# Patient Record
Sex: MALE | Race: OTHER | HISPANIC OR LATINO | URBAN - METROPOLITAN AREA
[De-identification: names, ages, dates, MRNs, and addresses within clinical notes are randomized per-mention and may not be internally consistent; named-entity substitution may affect disease eponyms.]

---

## 2020-09-03 ENCOUNTER — OFFICE VISIT (OUTPATIENT)
Dept: URBAN - METROPOLITAN AREA CLINIC 82 | Facility: CLINIC | Age: 70
End: 2020-09-03
Payer: MEDICARE

## 2020-09-03 DIAGNOSIS — Z86.010 PERSONAL HISTORY OF COLON POLYPS: ICD-10-CM

## 2020-09-03 DIAGNOSIS — K58.9 IRRITABLE BOWEL SYNDROME: ICD-10-CM

## 2020-09-03 DIAGNOSIS — R19.7 DIARRHEA: ICD-10-CM

## 2020-09-03 DIAGNOSIS — K76.0 FATTY LIVER: ICD-10-CM

## 2020-09-03 PROCEDURE — G8417 CALC BMI ABV UP PARAM F/U: HCPCS | Performed by: INTERNAL MEDICINE

## 2020-09-03 PROCEDURE — 1036F TOBACCO NON-USER: CPT | Performed by: INTERNAL MEDICINE

## 2020-09-03 PROCEDURE — G8427 DOCREV CUR MEDS BY ELIG CLIN: HCPCS | Performed by: INTERNAL MEDICINE

## 2020-09-03 PROCEDURE — 99214 OFFICE O/P EST MOD 30 MIN: CPT | Performed by: INTERNAL MEDICINE

## 2020-09-03 NOTE — HPI-TODAY'S VISIT:
09/03/2020 Patient presents for 6-month follow up. He denies any diarrhea or bloating. He had labs drawn about 4 weeks ago with Dr. Tabor. He is not on any medication at this time. Denies any melena or hematochezia.

## 2020-09-03 NOTE — HPI-OTHER HISTORIES
The patient is a 70 year old Other Race,  or  male, who presents on referral from Kim Tabor MD, for a colonoscopy. A copy of this document will be sent to the referring provider. The patient has never had a colonoscopy before. The patient does not have any risk factors for colon cancer, but is over the recommended age for screening.  The patient reports that there has been recent rectal bleeding. He reports melena. The melena been present for months It occurs rarely The patient also reports diarrhea. The diarrhea has been present months, and the patient states he is having approximately 2 stools per day.     09/13/2018 Patient admits to diarrhea when eating certain vegetables. Admits to occasional bloating. Admits to one episode of melena after drinking peptobismol. Patient admits to personal history of fatty liver. No weight loss.  11/2/18 Colonoscopy on 10/18/2018 found hemorrhoids and diverticulosis. Path normal. Patient stopped taking pill, because he thought it caused sugar to go up. Admits to loose bowels, states the symptoms have improved. Unable to tolerate Bentyl. Complaining of bloating.  12/07/2018 Liver bx on 11/23/2018 found mild steatosis w/ rare balloon cells and mild pericellular fibrosis (stage 1a of 4). No cirrhoisis was found. Blood work on 11/21/2018 found normal PT w/ INR, low Hgb at 13.2, and low platelets at 135.  Diarrhea improved on daily questran. He is also taking dicyclomine.  3/6/2019 Patient states he has been eating something small at night, to maintain his glucose levels. Symptoms improved. He denies any abdominal pain, bloaitng. He was taking Questran powder for diarrhea. Denies any leg swelling, no jaundice. He is taking the Aliyah twice a day. He states he stopped exercising for the past 2 weeks, since he was selling a house, but he has been active, going to the gym 3 times a week. He has lost about 15 lbs. Denies any melena or hematochezia.  9/17/2019 Labs drawn on 3/6/2019 showed low hemoglobin of 12.9, liver enzymes were normal. Repeat labs drawn with PCP Dr. Kim Tabor show high glucose and normal liver enzymes.  Patient denies any new symptoms since his last visit. He denies any jaundice, ascites or pedal edema. He states he finished taking Aliyah. He states he had repeat labs done last week, by Dr. Tabor. He states he went on vacation to [place] for 2 weeks and states he gained some weight. He denies drinking alcohol. He states he has been eating better and he exercises regularly. Denies any abdominal pain, bloating, constipation or diarrhea. Denies any melena or hematochezia.  03/10/2020 Patient states everything has been fine. He has occasional diarrhea, depending on what he eats. He states diarrhea occurs when he eats corn or greens. He states Questran helped with diarrhea, but he is not taking it at this time. Previous labs drawn in September 2019 with Dr. Tabor, showed normalized liver enzymes. He does complain of bloating and excess of gas. Denies any jaundice, abdominal pain, no ascites or pedal edema.

## 2020-09-05 LAB
A/G RATIO: 2.2
ALBUMIN: 4.7
ALKALINE PHOSPHATASE: 41
ALT (SGPT): 15
AST (SGOT): 16
BILIRUBIN, TOTAL: 0.3
BUN/CREATININE RATIO: 26
BUN: 26
CALCIUM: 9.7
CARBON DIOXIDE, TOTAL: 20
CHLORIDE: 103
CREATININE: 1.01
EGFR IF AFRICN AM: 87
EGFR IF NONAFRICN AM: 76
GLOBULIN, TOTAL: 2.1
GLUCOSE: 146
HEMATOCRIT: 40.3
HEMOGLOBIN: 12.5
MCH: 28.7
MCHC: 31
MCV: 92
NRBC: (no result)
PLATELETS: 146
POTASSIUM: 4.2
PROTEIN, TOTAL: 6.8
RBC: 4.36
RDW: 14.2
SODIUM: 139
WBC: 5.4

## 2020-09-10 ENCOUNTER — WEB ENCOUNTER (OUTPATIENT)
Dept: URBAN - METROPOLITAN AREA CLINIC 82 | Facility: CLINIC | Age: 70
End: 2020-09-10

## 2020-10-27 ENCOUNTER — OFFICE VISIT (OUTPATIENT)
Dept: URBAN - METROPOLITAN AREA CLINIC 82 | Facility: CLINIC | Age: 70
End: 2020-10-27

## 2020-11-05 ENCOUNTER — OFFICE VISIT (OUTPATIENT)
Dept: URBAN - METROPOLITAN AREA CLINIC 115 | Facility: CLINIC | Age: 70
End: 2020-11-05
Payer: MEDICARE

## 2020-11-05 ENCOUNTER — WEB ENCOUNTER (OUTPATIENT)
Dept: URBAN - METROPOLITAN AREA CLINIC 115 | Facility: CLINIC | Age: 70
End: 2020-11-05

## 2020-11-05 DIAGNOSIS — D50.9 IRON DEFICIENCY ANEMIA: ICD-10-CM

## 2020-11-05 DIAGNOSIS — K58.9 IRRITABLE BOWEL SYNDROME: ICD-10-CM

## 2020-11-05 DIAGNOSIS — R19.7 DIARRHEA: ICD-10-CM

## 2020-11-05 DIAGNOSIS — Z86.010 PERSONAL HISTORY OF COLON POLYPS: ICD-10-CM

## 2020-11-05 DIAGNOSIS — K76.0 FATTY LIVER: ICD-10-CM

## 2020-11-05 PROCEDURE — G8417 CALC BMI ABV UP PARAM F/U: HCPCS | Performed by: INTERNAL MEDICINE

## 2020-11-05 PROCEDURE — G8427 DOCREV CUR MEDS BY ELIG CLIN: HCPCS | Performed by: INTERNAL MEDICINE

## 2020-11-05 PROCEDURE — 99214 OFFICE O/P EST MOD 30 MIN: CPT | Performed by: INTERNAL MEDICINE

## 2020-11-05 PROCEDURE — 1036F TOBACCO NON-USER: CPT | Performed by: INTERNAL MEDICINE

## 2020-11-05 NOTE — HPI-TODAY'S VISIT:
09/03/2020 Patient presents for 6-month follow up. He denies any diarrhea or bloating. He had labs drawn about 4 weeks ago with Dr. Tabor. He is not on any medication at this time. Denies any melena or hematochezia.  11/05/2020 Patient presents for a follow up office visit. Labs on 09/03/2020 showed normal liver enzymes, hemoglobin is 12.5. Patient states he is gaining weight. He complains of retaining liquid and has noticed pedal edema. He states Dr. Tabor has started him on Furosemide. He did start a new blood pressure medication. He had repeat bloodwork recently, which showed hemoglobin 12.2, normal liver enzymes.

## 2020-11-06 LAB
IRON BIND.CAP.(TIBC): 394
IRON SATURATION: 20
IRON: 80
UIBC: 314

## 2020-11-13 ENCOUNTER — CLAIMS CREATED FROM THE CLAIM WINDOW (OUTPATIENT)
Dept: URBAN - METROPOLITAN AREA CLINIC 4 | Facility: CLINIC | Age: 70
End: 2020-11-13
Payer: MEDICARE

## 2020-11-13 ENCOUNTER — OFFICE VISIT (OUTPATIENT)
Dept: URBAN - METROPOLITAN AREA SURGERY CENTER 13 | Facility: SURGERY CENTER | Age: 70
End: 2020-11-13
Payer: MEDICARE

## 2020-11-13 DIAGNOSIS — K31.89 OTHER DISEASES OF STOMACH AND DUODENUM: ICD-10-CM

## 2020-11-13 DIAGNOSIS — K29.60 OTHER GASTRITIS WITHOUT BLEEDING: ICD-10-CM

## 2020-11-13 DIAGNOSIS — K21.9 ACID REFLUX: ICD-10-CM

## 2020-11-13 DIAGNOSIS — K21.00 GASTRO-ESOPHAGEAL REFLUX DISEASE WITH ESOPHAGITIS, WITHOUT BLEEDING: ICD-10-CM

## 2020-11-13 DIAGNOSIS — D50.9 ANEMIA, IRON DEFICIENCY: ICD-10-CM

## 2020-11-13 DIAGNOSIS — K31.89 ACQUIRED DEFORMITY OF DUODENUM: ICD-10-CM

## 2020-11-13 DIAGNOSIS — R10.13 ABDOMINAL DISCOMFORT, EPIGASTRIC: ICD-10-CM

## 2020-11-13 PROCEDURE — 88305 TISSUE EXAM BY PATHOLOGIST: CPT | Performed by: PATHOLOGY

## 2020-11-13 PROCEDURE — 43239 EGD BIOPSY SINGLE/MULTIPLE: CPT | Performed by: INTERNAL MEDICINE

## 2020-11-13 PROCEDURE — G8907 PT DOC NO EVENTS ON DISCHARG: HCPCS | Performed by: INTERNAL MEDICINE

## 2020-11-13 PROCEDURE — 88312 SPECIAL STAINS GROUP 1: CPT | Performed by: PATHOLOGY

## 2020-11-13 PROCEDURE — 88342 IMHCHEM/IMCYTCHM 1ST ANTB: CPT | Performed by: PATHOLOGY

## 2020-11-20 ENCOUNTER — TELEPHONE ENCOUNTER (OUTPATIENT)
Dept: URBAN - METROPOLITAN AREA CLINIC 115 | Facility: CLINIC | Age: 70
End: 2020-11-20

## 2020-12-17 ENCOUNTER — OFFICE VISIT (OUTPATIENT)
Dept: URBAN - METROPOLITAN AREA CLINIC 115 | Facility: CLINIC | Age: 70
End: 2020-12-17
Payer: MEDICARE

## 2020-12-17 DIAGNOSIS — K76.0 FATTY LIVER: ICD-10-CM

## 2020-12-17 DIAGNOSIS — R19.7 DIARRHEA: ICD-10-CM

## 2020-12-17 DIAGNOSIS — Z86.010 PERSONAL HISTORY OF COLON POLYPS: ICD-10-CM

## 2020-12-17 DIAGNOSIS — K58.9 IRRITABLE BOWEL SYNDROME: ICD-10-CM

## 2020-12-17 DIAGNOSIS — D50.9 IRON DEFICIENCY ANEMIA: ICD-10-CM

## 2020-12-17 PROCEDURE — G9903 PT SCRN TBCO ID AS NON USER: HCPCS | Performed by: INTERNAL MEDICINE

## 2020-12-17 PROCEDURE — G8417 CALC BMI ABV UP PARAM F/U: HCPCS | Performed by: INTERNAL MEDICINE

## 2020-12-17 PROCEDURE — G8427 DOCREV CUR MEDS BY ELIG CLIN: HCPCS | Performed by: INTERNAL MEDICINE

## 2020-12-17 PROCEDURE — 99214 OFFICE O/P EST MOD 30 MIN: CPT | Performed by: INTERNAL MEDICINE

## 2020-12-17 RX ORDER — HYDROCORTISONE 2.5% 25 MG/G
1 APPLICATION CREAM TOPICAL TWICE A DAY
Qty: 60 GRAMS | Refills: 1 | OUTPATIENT
Start: 2020-12-17 | End: 2021-01-14

## 2020-12-17 NOTE — HPI-OTHER HISTORIES
11/2020  The patient is a 70 year old Other Race,  or  male, who presents on referral from Kim Tabor MD, for a colonoscopy. A copy of this document will be sent to the referring provider. The patient has never had a colonoscopy before. The patient does not have any risk factors for colon cancer, but is over the recommended age for screening.  The patient reports that there has been recent rectal bleeding. He reports melena. The melena been present for months It occurs rarely The patient also reports diarrhea. The diarrhea has been present months, and the patient states he is having approximately 2 stools per day.     09/13/2018 Patient admits to diarrhea when eating certain vegetables. Admits to occasional bloating. Admits to one episode of melena after drinking peptobismol. Patient admits to personal history of fatty liver. No weight loss.  11/2/18 Colonoscopy on 10/18/2018 found hemorrhoids and diverticulosis. Path normal. Patient stopped taking pill, because he thought it caused sugar to go up. Admits to loose bowels, states the symptoms have improved. Unable to tolerate Bentyl. Complaining of bloating.  12/07/2018 Liver bx on 11/23/2018 found mild steatosis w/ rare balloon cells and mild pericellular fibrosis (stage 1a of 4). No cirrhoisis was found. Blood work on 11/21/2018 found normal PT w/ INR, low Hgb at 13.2, and low platelets at 135.  Diarrhea improved on daily questran. He is also taking dicyclomine.  3/6/2019 Patient states he has been eating something small at night, to maintain his glucose levels. Symptoms improved. He denies any abdominal pain, bloaitng. He was taking Questran powder for diarrhea. Denies any leg swelling, no jaundice. He is taking the Aliyah twice a day. He states he stopped exercising for the past 2 weeks, since he was selling a house, but he has been active, going to the gym 3 times a week. He has lost about 15 lbs. Denies any melena or hematochezia.  9/17/2019 Labs drawn on 3/6/2019 showed low hemoglobin of 12.9, liver enzymes were normal. Repeat labs drawn with PCP Dr. Kim Tabor show high glucose and normal liver enzymes.  Patient denies any new symptoms since his last visit. He denies any jaundice, ascites or pedal edema. He states he finished taking Aliyah. He states he had repeat labs done last week, by Dr. Tabor. He states he went on vacation to [place] for 2 weeks and states he gained some weight. He denies drinking alcohol. He states he has been eating better and he exercises regularly. Denies any abdominal pain, bloating, constipation or diarrhea. Denies any melena or hematochezia.  03/10/2020 Patient states everything has been fine. He has occasional diarrhea, depending on what he eats. He states diarrhea occurs when he eats corn or greens. He states Questran helped with diarrhea, but he is not taking it at this time. Previous labs drawn in September 2019 with Dr. Tabor, showed normalized liver enzymes. He does complain of bloating and excess of gas. Denies any jaundice, abdominal pain, no ascites or pedal edema.

## 2020-12-17 NOTE — HPI-TODAY'S VISIT:
09/03/2020 Patient presents for 6-month follow up. He denies any diarrhea or bloating. He had labs drawn about 4 weeks ago with Dr. Tabor. He is not on any medication at this time. Denies any melena or hematochezia.  11/05/2020 Patient presents for a follow up office visit. Labs on 09/03/2020 showed normal liver enzymes, hemoglobin is 12.5. Patient states he is gaining weight. He complains of retaining liquid and has noticed pedal edema. He states Dr. Tabor has started him on Furosemide. He did start a new blood pressure medication. He had repeat bloodwork recently, which showed hemoglobin 12.2, normal liver enzymes.  12/17/2020 Patient presents for a follow up office visit. Patient's iron saturation is 20%. EGD on 11/13/2020 showed gastritis and esophagitis. Biopsy showed gastritis, no H pylori.  He states he saw the ENT and found to have acid reflux in the throat. He was also advised to check for food allergies. Biopsy during egd did not reveal any eosinophilic esophagitis. He was prescribed pantoprazole by the ENT and continues to take it daily. He states he has been trying to maintain his blood sugar levels.  He does want to consider hemorrhoidectomy for his hemorrhoids in the future.

## 2021-06-15 ENCOUNTER — TELEPHONE ENCOUNTER (OUTPATIENT)
Dept: URBAN - METROPOLITAN AREA CLINIC 115 | Facility: CLINIC | Age: 71
End: 2021-06-15

## 2021-06-17 ENCOUNTER — LAB OUTSIDE AN ENCOUNTER (OUTPATIENT)
Dept: URBAN - METROPOLITAN AREA CLINIC 115 | Facility: CLINIC | Age: 71
End: 2021-06-17

## 2021-06-17 ENCOUNTER — OFFICE VISIT (OUTPATIENT)
Dept: URBAN - METROPOLITAN AREA CLINIC 115 | Facility: CLINIC | Age: 71
End: 2021-06-17
Payer: MEDICARE

## 2021-06-17 ENCOUNTER — WEB ENCOUNTER (OUTPATIENT)
Dept: URBAN - METROPOLITAN AREA CLINIC 115 | Facility: CLINIC | Age: 71
End: 2021-06-17

## 2021-06-17 ENCOUNTER — OFFICE VISIT (OUTPATIENT)
Dept: URBAN - METROPOLITAN AREA CLINIC 115 | Facility: CLINIC | Age: 71
End: 2021-06-17

## 2021-06-17 DIAGNOSIS — D50.9 IRON DEFICIENCY ANEMIA: ICD-10-CM

## 2021-06-17 DIAGNOSIS — Z86.010 PERSONAL HISTORY OF COLON POLYPS: ICD-10-CM

## 2021-06-17 DIAGNOSIS — K58.9 IRRITABLE BOWEL SYNDROME: ICD-10-CM

## 2021-06-17 DIAGNOSIS — R19.7 DIARRHEA: ICD-10-CM

## 2021-06-17 DIAGNOSIS — K76.0 FATTY LIVER: ICD-10-CM

## 2021-06-17 PROCEDURE — 99214 OFFICE O/P EST MOD 30 MIN: CPT | Performed by: INTERNAL MEDICINE

## 2021-06-17 PROCEDURE — G9903 PT SCRN TBCO ID AS NON USER: HCPCS | Performed by: INTERNAL MEDICINE

## 2021-06-17 PROCEDURE — G8427 DOCREV CUR MEDS BY ELIG CLIN: HCPCS | Performed by: INTERNAL MEDICINE

## 2021-06-17 PROCEDURE — G8417 CALC BMI ABV UP PARAM F/U: HCPCS | Performed by: INTERNAL MEDICINE

## 2021-06-17 RX ORDER — DICYCLOMINE HYDROCHLORIDE 10 MG/1
1 TABLET CAPSULE ORAL THREE TIMES A DAY
Qty: 270 TABLET | Refills: 1 | OUTPATIENT
Start: 2021-06-17 | End: 2021-12-13

## 2021-06-17 NOTE — HPI-TODAY'S VISIT:
09/03/2020 Patient presents for 6-month follow up. He denies any diarrhea or bloating. He had labs drawn about 4 weeks ago with Dr. Tabor. He is not on any medication at this time. Denies any melena or hematochezia.  11/05/2020 Patient presents for a follow up office visit. Labs on 09/03/2020 showed normal liver enzymes, hemoglobin is 12.5. Patient states he is gaining weight. He complains of retaining liquid and has noticed pedal edema. He states Dr. Tabor has started him on Furosemide. He did start a new blood pressure medication. He had repeat bloodwork recently, which showed hemoglobin 12.2, normal liver enzymes.  12/17/2020 Patient presents for a follow up office visit. Patient's iron saturation is 20%. EGD on 11/13/2020 showed gastritis and esophagitis. Biopsy showed gastritis, no H pylori. He states he saw the ENT and found to have acid reflux in the throat. He was also advised to check for food allergies. Biopsy during egd did not reveal any eosinophilic esophagitis. He was prescribed pantoprazole by the ENT and continues to take it daily. He states he has been trying to maintain his blood sugar levels. He does want to consider hemorrhoidectomy for his hemorrhoids in the future.   06/17/2021 Patient presents for a follow up office visit. EGD on 11/13/2020 showed esophagitis and gastritis. Biopsy showed gastritis, no H pylori. Patient says he is feeling well. He is not currently taking any stomach medications. His diarrhea and abdominal pain have resolved. He still experiences occasional bloating. Denies any acid reflux or constipation.

## 2021-07-07 ENCOUNTER — LAB OUTSIDE AN ENCOUNTER (OUTPATIENT)
Dept: URBAN - METROPOLITAN AREA CLINIC 115 | Facility: CLINIC | Age: 71
End: 2021-07-07

## 2021-07-08 NOTE — PHYSICAL EXAM LYMPHATIC:
Neck , no lymphadenopathy The patient's IBS symptoms have been under good control. Continue high fiber diet and fiber supplements as needed. Call if any exacerbations.

## 2021-12-16 ENCOUNTER — OFFICE VISIT (OUTPATIENT)
Dept: URBAN - METROPOLITAN AREA CLINIC 115 | Facility: CLINIC | Age: 71
End: 2021-12-16

## 2023-02-02 ENCOUNTER — OFFICE VISIT (OUTPATIENT)
Dept: URBAN - METROPOLITAN AREA CLINIC 115 | Facility: CLINIC | Age: 73
End: 2023-02-02
Payer: MEDICARE

## 2023-02-02 ENCOUNTER — DASHBOARD ENCOUNTERS (OUTPATIENT)
Age: 73
End: 2023-02-02

## 2023-02-02 ENCOUNTER — LAB OUTSIDE AN ENCOUNTER (OUTPATIENT)
Dept: URBAN - METROPOLITAN AREA CLINIC 115 | Facility: CLINIC | Age: 73
End: 2023-02-02

## 2023-02-02 ENCOUNTER — WEB ENCOUNTER (OUTPATIENT)
Dept: URBAN - METROPOLITAN AREA CLINIC 115 | Facility: CLINIC | Age: 73
End: 2023-02-02

## 2023-02-02 VITALS — HEIGHT: 68 IN | WEIGHT: 203.4 LBS | TEMPERATURE: 98.6 F | HEART RATE: 81 BPM | BODY MASS INDEX: 30.83 KG/M2

## 2023-02-02 DIAGNOSIS — K76.0 FATTY LIVER: ICD-10-CM

## 2023-02-02 DIAGNOSIS — D50.9 IRON DEFICIENCY ANEMIA: ICD-10-CM

## 2023-02-02 DIAGNOSIS — R19.7 DIARRHEA: ICD-10-CM

## 2023-02-02 DIAGNOSIS — Z86.010 PERSONAL HISTORY OF COLON POLYPS: ICD-10-CM

## 2023-02-02 DIAGNOSIS — R14.0 BLOATING: ICD-10-CM

## 2023-02-02 DIAGNOSIS — K58.9 IRRITABLE BOWEL SYNDROME: ICD-10-CM

## 2023-02-02 DIAGNOSIS — Z12.11 SCREENING FOR COLON CANCER: ICD-10-CM

## 2023-02-02 PROBLEM — 87522002 IRON DEFICIENCY ANEMIA: Status: ACTIVE | Noted: 2020-11-05

## 2023-02-02 PROBLEM — 10743008 IRRITABLE BOWEL SYNDROME: Status: ACTIVE | Noted: 2020-09-03

## 2023-02-02 PROBLEM — 428283002 HISTORY OF POLYP OF COLON: Status: ACTIVE | Noted: 2020-09-03

## 2023-02-02 PROCEDURE — G9903 PT SCRN TBCO ID AS NON USER: HCPCS | Performed by: INTERNAL MEDICINE

## 2023-02-02 PROCEDURE — G8417 CALC BMI ABV UP PARAM F/U: HCPCS | Performed by: INTERNAL MEDICINE

## 2023-02-02 PROCEDURE — G8427 DOCREV CUR MEDS BY ELIG CLIN: HCPCS | Performed by: INTERNAL MEDICINE

## 2023-02-02 PROCEDURE — 99214 OFFICE O/P EST MOD 30 MIN: CPT | Performed by: INTERNAL MEDICINE

## 2023-02-02 RX ORDER — ROSUVASTATIN CALCIUM 10 MG/1
1 TABLET TABLET, FILM COATED ORAL ONCE A DAY
Status: ACTIVE | COMMUNITY

## 2023-02-02 RX ORDER — POLYETHYLENE GLYCOL-3350 AND ELECTROLYTES WITH FLAVOR PACK 240; 5.84; 2.98; 6.72; 22.72 G/278.26G; G/278.26G; G/278.26G; G/278.26G; G/278.26G
AS DIRECTED POWDER, FOR SOLUTION ORAL
Qty: 1 | Refills: 0 | OUTPATIENT
Start: 2023-02-02 | End: 2023-02-03

## 2023-02-02 RX ORDER — FUROSEMIDE 20 MG/1
1 TABLET TABLET ORAL ONCE A DAY
Status: ACTIVE | COMMUNITY

## 2023-02-02 RX ORDER — SITAGLIPTIN 50 MG/1
AS DIRECTED TABLET, FILM COATED ORAL
Status: ACTIVE | COMMUNITY

## 2023-02-02 RX ORDER — DICYCLOMINE HYDROCHLORIDE 10 MG/1
1 TABLET CAPSULE ORAL
Qty: 90 | Refills: 0 | OUTPATIENT
Start: 2023-02-02 | End: 2023-03-04

## 2023-02-02 NOTE — HPI-TODAY'S VISIT:
09/03/2020 Patient presents for 6-month follow up. He denies any diarrhea or bloating. He had labs drawn about 4 weeks ago with Dr. Tabor. He is not on any medication at this time. Denies any melena or hematochezia.  11/05/2020 Patient presents for a follow up office visit. Labs on 09/03/2020 showed normal liver enzymes, hemoglobin is 12.5. Patient states he is gaining weight. He complains of retaining liquid and has noticed pedal edema. He states Dr. Tabor has started him on Furosemide. He did start a new blood pressure medication. He had repeat bloodwork recently, which showed hemoglobin 12.2, normal liver enzymes.  12/17/2020 Patient presents for a follow up office visit. Patient's iron saturation is 20%. EGD on 11/13/2020 showed gastritis and esophagitis. Biopsy showed gastritis, no H pylori. He states he saw the ENT and found to have acid reflux in the throat. He was also advised to check for food allergies. Biopsy during egd did not reveal any eosinophilic esophagitis. He was prescribed pantoprazole by the ENT and continues to take it daily. He states he has been trying to maintain his blood sugar levels. He does want to consider hemorrhoidectomy for his hemorrhoids in the future.   06/17/2021 Patient presents for a follow up office visit. EGD on 11/13/2020 showed esophagitis and gastritis. Biopsy showed gastritis, no H pylori. Patient says he is feeling well. He is not currently taking any stomach medications. His diarrhea and abdominal pain have resolved. He still experiences occasional bloating. Denies any acid reflux or constipation.  2/2/2023 Patient presents for a digestive issue. Patient states he has a lot of bloating and noises in his stomach. He denies constipation and he states that he sometimes has diarrhea. The only issue is the bloating sensation and the noises in his stomach. Patient states diarrhea is more common.

## 2023-06-02 ENCOUNTER — OFFICE VISIT (OUTPATIENT)
Dept: URBAN - METROPOLITAN AREA SURGERY CENTER 13 | Facility: SURGERY CENTER | Age: 73
End: 2023-06-02
Payer: MEDICARE

## 2023-06-02 ENCOUNTER — CLAIMS CREATED FROM THE CLAIM WINDOW (OUTPATIENT)
Dept: URBAN - METROPOLITAN AREA CLINIC 4 | Facility: CLINIC | Age: 73
End: 2023-06-02
Payer: MEDICARE

## 2023-06-02 DIAGNOSIS — D12.2 ADENOMA OF ASCENDING COLON: ICD-10-CM

## 2023-06-02 DIAGNOSIS — D12.2 BENIGN NEOPLASM OF ASCENDING COLON: ICD-10-CM

## 2023-06-02 DIAGNOSIS — Z12.11 COLON CANCER SCREENING: ICD-10-CM

## 2023-06-02 PROCEDURE — G8907 PT DOC NO EVENTS ON DISCHARG: HCPCS | Performed by: INTERNAL MEDICINE

## 2023-06-02 PROCEDURE — 45385 COLONOSCOPY W/LESION REMOVAL: CPT | Performed by: INTERNAL MEDICINE

## 2023-06-02 PROCEDURE — 88305 TISSUE EXAM BY PATHOLOGIST: CPT | Performed by: PATHOLOGY

## 2023-07-27 ENCOUNTER — TELEPHONE ENCOUNTER (OUTPATIENT)
Dept: URBAN - METROPOLITAN AREA CLINIC 115 | Facility: CLINIC | Age: 73
End: 2023-07-27

## 2023-07-27 RX ORDER — DICYCLOMINE HYDROCHLORIDE 10 MG/1
1 TABLET CAPSULE ORAL
Qty: 90 | Refills: 0
Start: 2023-02-02 | End: 2023-08-26

## 2023-10-23 NOTE — HPI-OTHER HISTORIES
The patient is a 70 year old Other Race,  or  male, who presents on referral from Kim Tabor MD, for a colonoscopy. A copy of this document will be sent to the referring provider. The patient has never had a colonoscopy before. The patient does not have any risk factors for colon cancer, but is over the recommended age for screening.  The patient reports that there has been recent rectal bleeding. He reports melena. The melena been present for months It occurs rarely The patient also reports diarrhea. The diarrhea has been present months, and the patient states he is having approximately 2 stools per day.     09/13/2018 Patient admits to diarrhea when eating certain vegetables. Admits to occasional bloating. Admits to one episode of melena after drinking peptobismol. Patient admits to personal history of fatty liver. No weight loss.  11/2/18 Colonoscopy on 10/18/2018 found hemorrhoids and diverticulosis. Path normal. Patient stopped taking pill, because he thought it caused sugar to go up. Admits to loose bowels, states the symptoms have improved. Unable to tolerate Bentyl. Complaining of bloating.  12/07/2018 Liver bx on 11/23/2018 found mild steatosis w/ rare balloon cells and mild pericellular fibrosis (stage 1a of 4). No cirrhoisis was found. Blood work on 11/21/2018 found normal PT w/ INR, low Hgb at 13.2, and low platelets at 135.  Diarrhea improved on daily questran. He is also taking dicyclomine.  3/6/2019 Patient states he has been eating something small at night, to maintain his glucose levels. Symptoms improved. He denies any abdominal pain, bloaitng. He was taking Questran powder for diarrhea. Denies any leg swelling, no jaundice. He is taking the Aliyah twice a day. He states he stopped exercising for the past 2 weeks, since he was selling a house, but he has been active, going to the gym 3 times a week. He has lost about 15 lbs. Denies any melena or hematochezia.  9/17/2019 Labs drawn on 3/6/2019 showed low hemoglobin of 12.9, liver enzymes were normal. Repeat labs drawn with PCP Dr. Kim Tabor show high glucose and normal liver enzymes.  Patient denies any new symptoms since his last visit. He denies any jaundice, ascites or pedal edema. He states he finished taking Aliyah. He states he had repeat labs done last week, by Dr. Tabor. He states he went on vacation to [place] for 2 weeks and states he gained some weight. He denies drinking alcohol. He states he has been eating better and he exercises regularly. Denies any abdominal pain, bloating, constipation or diarrhea. Denies any melena or hematochezia.  03/10/2020 Patient states everything has been fine. He has occasional diarrhea, depending on what he eats. He states diarrhea occurs when he eats corn or greens. He states Questran helped with diarrhea, but he is not taking it at this time. Previous labs drawn in September 2019 with Dr. Tabor, showed normalized liver enzymes. He does complain of bloating and excess of gas. Denies any jaundice, abdominal pain, no ascites or pedal edema. Product 8 Application Directions: Apply to clean skin prior to moisturizer. Dispense desired amount and use applicator to massage serum onto any fine lines and wrinkles. You will feel a cooling effect from the customized metal tip. Avoid direct eye contact. Use twice daily. NOTE: A thin layer of product is all that is needed. No need for heavy application. (Packaging contains slightly different verbiage.

## 2025-05-29 ENCOUNTER — OFFICE VISIT (OUTPATIENT)
Dept: URBAN - METROPOLITAN AREA CLINIC 115 | Facility: CLINIC | Age: 75
End: 2025-05-29
Payer: COMMERCIAL

## 2025-05-29 DIAGNOSIS — R10.32 LLQ ABDOMINAL PAIN: ICD-10-CM

## 2025-05-29 DIAGNOSIS — R19.7 DIARRHEA: ICD-10-CM

## 2025-05-29 DIAGNOSIS — R10.13 DYSPEPSIA: ICD-10-CM

## 2025-05-29 DIAGNOSIS — K76.0 FATTY LIVER: ICD-10-CM

## 2025-05-29 DIAGNOSIS — Z12.11 SCREENING FOR COLON CANCER: ICD-10-CM

## 2025-05-29 DIAGNOSIS — D50.9 IRON DEFICIENCY ANEMIA: ICD-10-CM

## 2025-05-29 DIAGNOSIS — R14.0 BLOATING: ICD-10-CM

## 2025-05-29 DIAGNOSIS — Z86.0100 PERSONAL HISTORY OF COLONIC POLYPS: ICD-10-CM

## 2025-05-29 DIAGNOSIS — K58.9 IRRITABLE BOWEL SYNDROME: ICD-10-CM

## 2025-05-29 PROCEDURE — 99214 OFFICE O/P EST MOD 30 MIN: CPT | Performed by: INTERNAL MEDICINE

## 2025-05-29 RX ORDER — FUROSEMIDE 20 MG/1
1 TABLET TABLET ORAL ONCE A DAY
Status: ACTIVE | COMMUNITY

## 2025-05-29 RX ORDER — PANTOPRAZOLE SODIUM 40 MG/1
1 TABLET TABLET, DELAYED RELEASE ORAL ONCE A DAY
Qty: 90 TABLET | Refills: 3 | OUTPATIENT
Start: 2025-05-29

## 2025-05-29 RX ORDER — ROSUVASTATIN 10 MG/1
1 TABLET TABLET, FILM COATED ORAL ONCE A DAY
Status: ACTIVE | COMMUNITY

## 2025-05-29 RX ORDER — SITAGLIPTIN 50 MG/1
AS DIRECTED TABLET, FILM COATED ORAL
Status: ACTIVE | COMMUNITY

## 2025-05-29 RX ORDER — DICYCLOMINE HYDROCHLORIDE 10 MG/1
1 TABLET CAPSULE ORAL THREE TIMES A DAY
Qty: 270 TABLET | Refills: 1 | OUTPATIENT
Start: 2025-05-29

## 2025-05-29 NOTE — PHYSICAL EXAM GASTROINTESTINAL
Abdomen , soft, nontender, nondistended , no guarding or rigidity , no masses palpable , normal bowel sounds , Liver and Spleen , no hepatomegaly present , no hepatosplenomegaly , liver nontender , spleen not palpable DVT ppx: Heparin SQ  Diet: DASH  Dispo: PT consulted

## 2025-05-29 NOTE — HPI-TODAY'S VISIT:
09/03/2020 Patient presents for 6-month follow up. He denies any diarrhea or bloating. He had labs drawn about 4 weeks ago with Dr. Tabor. He is not on any medication at this time. Denies any melena or hematochezia.  11/05/2020 Patient presents for a follow up office visit. Labs on 09/03/2020 showed normal liver enzymes, hemoglobin is 12.5. Patient states he is gaining weight. He complains of retaining liquid and has noticed pedal edema. He states Dr. Tabor has started him on Furosemide. He did start a new blood pressure medication. He had repeat bloodwork recently, which showed hemoglobin 12.2, normal liver enzymes.  12/17/2020 Patient presents for a follow up office visit. Patient's iron saturation is 20%. EGD on 11/13/2020 showed gastritis and esophagitis. Biopsy showed gastritis, no H pylori. He states he saw the ENT and found to have acid reflux in the throat. He was also advised to check for food allergies. Biopsy during egd did not reveal any eosinophilic esophagitis. He was prescribed pantoprazole by the ENT and continues to take it daily. He states he has been trying to maintain his blood sugar levels. He does want to consider hemorrhoidectomy for his hemorrhoids in the future.   06/17/2021 Patient presents for a follow up office visit. EGD on 11/13/2020 showed esophagitis and gastritis. Biopsy showed gastritis, no H pylori. Patient says he is feeling well. He is not currently taking any stomach medications. His diarrhea and abdominal pain have resolved. He still experiences occasional bloating. Denies any acid reflux or constipation.  2/2/2023 Patient presents for a digestive issue. Patient states he has a lot of bloating and noises in his stomach. He denies constipation and he states that he sometimes has diarrhea. The only issue is the bloating sensation and the noises in his stomach. Patient states diarrhea is more common.  5/29/2025 Patient presents for abdominal pain. He says he has been having abdominal pain and heartburn. He has been takign omeprazole. Patient has been started on Mounjaro recently.

## 2025-08-28 ENCOUNTER — OFFICE VISIT (OUTPATIENT)
Dept: URBAN - METROPOLITAN AREA CLINIC 115 | Facility: CLINIC | Age: 75
End: 2025-08-28
Payer: COMMERCIAL

## 2025-08-28 DIAGNOSIS — R14.0 BLOATING: ICD-10-CM

## 2025-08-28 DIAGNOSIS — Z86.0100 PERSONAL HISTORY OF COLONIC POLYPS: ICD-10-CM

## 2025-08-28 DIAGNOSIS — R10.32 LLQ ABDOMINAL PAIN: ICD-10-CM

## 2025-08-28 DIAGNOSIS — Z12.11 SCREENING FOR COLON CANCER: ICD-10-CM

## 2025-08-28 DIAGNOSIS — R10.13 DYSPEPSIA: ICD-10-CM

## 2025-08-28 DIAGNOSIS — K76.0 FATTY LIVER: ICD-10-CM

## 2025-08-28 DIAGNOSIS — D50.9 IRON DEFICIENCY ANEMIA: ICD-10-CM

## 2025-08-28 DIAGNOSIS — K58.9 IRRITABLE BOWEL SYNDROME: ICD-10-CM

## 2025-08-28 DIAGNOSIS — R19.7 DIARRHEA: ICD-10-CM

## 2025-08-28 PROCEDURE — 99214 OFFICE O/P EST MOD 30 MIN: CPT | Performed by: INTERNAL MEDICINE

## 2025-08-28 RX ORDER — PANTOPRAZOLE SODIUM 40 MG/1
1 TABLET TABLET, DELAYED RELEASE ORAL ONCE A DAY
Qty: 90 TABLET | Refills: 3 | Status: ACTIVE | COMMUNITY
Start: 2025-05-29

## 2025-08-28 RX ORDER — ROSUVASTATIN 10 MG/1
1 TABLET TABLET, FILM COATED ORAL ONCE A DAY
Status: ACTIVE | COMMUNITY

## 2025-08-28 RX ORDER — DICYCLOMINE HYDROCHLORIDE 10 MG/1
1 TABLET CAPSULE ORAL THREE TIMES A DAY
Qty: 270 TABLET | Refills: 1 | Status: ACTIVE | COMMUNITY
Start: 2025-05-29

## 2025-08-28 RX ORDER — SITAGLIPTIN 50 MG/1
AS DIRECTED TABLET, FILM COATED ORAL
Status: ACTIVE | COMMUNITY

## 2025-08-28 RX ORDER — FUROSEMIDE 20 MG/1
1 TABLET TABLET ORAL ONCE A DAY
Status: ACTIVE | COMMUNITY

## 2025-08-28 RX ORDER — SUCRALFATE 1 G/1
1 TABLET ON AN EMPTY STOMACH TABLET ORAL
Qty: 90 | Refills: 1 | OUTPATIENT
Start: 2025-08-28

## 2025-08-28 RX ORDER — PANTOPRAZOLE SODIUM 40 MG/1
1 TABLET TABLET, DELAYED RELEASE ORAL ONCE A DAY
Qty: 90 TABLET | Refills: 3 | OUTPATIENT
Start: 2025-08-28